# Patient Record
Sex: MALE | Race: BLACK OR AFRICAN AMERICAN | URBAN - METROPOLITAN AREA
[De-identification: names, ages, dates, MRNs, and addresses within clinical notes are randomized per-mention and may not be internally consistent; named-entity substitution may affect disease eponyms.]

---
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
## 2022-04-11 ENCOUNTER — HISTORICAL (OUTPATIENT)
Dept: ADMINISTRATIVE | Facility: HOSPITAL | Age: 22
End: 2022-04-11
Payer: MEDICAID

## 2022-04-29 VITALS
SYSTOLIC BLOOD PRESSURE: 107 MMHG | WEIGHT: 145.5 LBS | BODY MASS INDEX: 22.84 KG/M2 | HEIGHT: 67 IN | DIASTOLIC BLOOD PRESSURE: 70 MMHG

## 2022-04-30 NOTE — OP NOTE
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 After a thorough discussion of the risks, benefits and   alternatives to PKP, including, but not limited to, the rare risks of infection, retinal   detachment, need for additional surgery, loss of vision and even loss of the eye, the patient   voices good understanding and desires to proceed.   DESCRIPTION OF PROCEDURE:   The proper eye was verified and marked in the pre-op holding area. The patient was brought to the operating room in supine position where the eye was prepped and draped in standard sterile fashion using 5% betadine and a lid speculum placed. The host cornea was marked with radial markers. An 7.75 mm corneal punch was used on the donor tissue and the host cornea was incised using a 7.5 mm trephine. Corneal scissors were used to excise the host cornea.The donor cornea was placed and 4 cardinal 10-0 nylon sutures were placed at 12, 6, 3, and 9 oclock. A total of 16 sutures were placed in a radial fashion. The sutures were rotated inward and buried. Ancef and Decadron were injected subconjunctivally.The patient tolerated the procedure well and was taken to the recovery area in stable condition. The patient was instructed to follow-up for routine post-operative care the same afternoon.

## 2022-04-30 NOTE — H&P
* Final Report *  Document Contains [facility]  Ophthalmology general clinic visit     Patient:   Gael Aguero             MRN: [id number]            FIN: [id number]               Age:   17 years     Sex:  Male     :  2000   Associated Diagnoses:   None   Author:   Jose Angel Coburn MD      Chief Complaint   2017 10:59 CST     PreOp for PKP OS; scheduled for 17.        History of Present Illness   Here to sign pre-op paperwork for PKP this friday. Doing well, no complaints ready to have surgery.       Review of Systems   Constitutional:  Negative.    Respiratory:  Negative.    Cardiovascular:  Negative.    Gastrointestinal:  Negative.       Health Status   Allergies:    Allergic Reactions (All)  No Known Allergies,    Allergies (1) Active Reaction  No Known Allergies None Documented     Current medications:  (Selected)   Documented Medications  Documented  KETOTIFEN FUM 0.025% EYE DROPS:    Problem list:    All Problems  Keratoconus / SNOMED CT 668721271 / Confirmed      Histories   Past Medical History:    No active or resolved past medical history items have been selected or recorded.   Family History:       Procedure history:    Circumcision (569602535).  Tonsillectomy and adenoidectomy (500863564).     Social history:  - Tobacco: neg  - EtOH: neg  - IVDA: neg      Physical Examination   Eye     Va SC:  OD: 20/70 PH NI  OS: CF PH NI  IOP: 17//NTP    External: no deformities or lacerations  L/L: normal  Conj/Sclera: white and quiet  Cornea: fleischer ring OD; marked apical thinning/ectasia OS  AC: deep and quiet  Iris: round and reactive  Lens: clear    Stanley  OD: inferotemporal cone  OS:ectasia, irregular astigmatism, 14.65 D @ 028    Assessment/Plan    1. 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 Dr. Gomez in [place] regarding potential CXL OD who recommended CXL OD per patient -- will d/w Dr. Valverde  - Pt elects to proceed w/ PKP OS, scheduled for 17 -- all orders/paperwork signed  today    RT POD#1               Impression and Plan     _    _    _     Addendum by Jadyn Harkins MD on November 29, 2017 12:02 CST (Verified)  Agree with residents findings and plan as documented in the residents note.    Result type: Office/Clinic Note-Physician  Result date: November 29, 2017 11:49 CST  Result status: Modified  Result title: Ophthalmology general clinic visit  Performed by: Jose Angel Coburn MD on November 29, 2017 11:52 CST  Verified by: Jose Angel Coburn MD on November 29, 2017 11:52 CST  Encounter info: [id number], [facility], Clinic Visit, 11/29/2017 - 11/29/2017    Doc has been moved by HIM specialist.